# Patient Record
Sex: MALE | Race: WHITE | HISPANIC OR LATINO | Employment: FULL TIME | URBAN - METROPOLITAN AREA
[De-identification: names, ages, dates, MRNs, and addresses within clinical notes are randomized per-mention and may not be internally consistent; named-entity substitution may affect disease eponyms.]

---

## 2023-02-13 ENCOUNTER — OFFICE VISIT (OUTPATIENT)
Dept: URGENT CARE | Facility: CLINIC | Age: 23
End: 2023-02-13

## 2023-02-13 VITALS
RESPIRATION RATE: 14 BRPM | DIASTOLIC BLOOD PRESSURE: 70 MMHG | HEART RATE: 104 BPM | OXYGEN SATURATION: 99 % | SYSTOLIC BLOOD PRESSURE: 120 MMHG | TEMPERATURE: 98.4 F | WEIGHT: 147.5 LBS

## 2023-02-13 DIAGNOSIS — R30.0 DYSURIA: Primary | ICD-10-CM

## 2023-02-13 DIAGNOSIS — N30.01 ACUTE CYSTITIS WITH HEMATURIA: ICD-10-CM

## 2023-02-13 LAB
SL AMB  POCT GLUCOSE, UA: ABNORMAL
SL AMB LEUKOCYTE ESTERASE,UA: ABNORMAL
SL AMB POCT BILIRUBIN,UA: ABNORMAL
SL AMB POCT BLOOD,UA: ABNORMAL
SL AMB POCT CLARITY,UA: ABNORMAL
SL AMB POCT COLOR,UA: YELLOW
SL AMB POCT KETONES,UA: 80
SL AMB POCT NITRITE,UA: ABNORMAL
SL AMB POCT PH,UA: 6.5
SL AMB POCT SPECIFIC GRAVITY,UA: 1.01
SL AMB POCT URINE PROTEIN: 100
SL AMB POCT UROBILINOGEN: 0.2

## 2023-02-13 RX ORDER — SULFAMETHOXAZOLE AND TRIMETHOPRIM 800; 160 MG/1; MG/1
1 TABLET ORAL EVERY 12 HOURS SCHEDULED
Qty: 14 TABLET | Refills: 0 | Status: SHIPPED | OUTPATIENT
Start: 2023-02-13 | End: 2023-02-20

## 2023-02-13 NOTE — PATIENT INSTRUCTIONS
Will send for culture  Check my chart for urine culture and STD results  Start antibiotic and take as directed  Take probiotic  Drink plenty of fluids, water or cranberry juice  Avoid caffeine and alcohol  Tylenol or Motrin for pain or fever  Azo for bladder spasms  Follow up with PCP in 3-5 days  If you develop fever, flank pain, vomiting, abdominal pain, or any new or concerning symptoms please return or proceed to ER

## 2023-02-13 NOTE — PROGRESS NOTES
[id number]        NAME: Melida Anderson is a 23 y o  male  : 2000    MRN: [id number]  DATE: 2023  TIME: 6:39 PM    Assessment and Plan   Dysuria [R30 0]  1  Dysuria  POCT urine dip    Urine culture    Chlamydia/GC amplified DNA by PCR      2  Acute cystitis with hematuria  sulfamethoxazole-trimethoprim (BACTRIM DS) 800-160 mg per tablet            Patient Instructions     Will send for culture  Check my chart for urine culture and STD results  Start antibiotic and take as directed  Take probiotic  Drink plenty of fluids, water or cranberry juice  Avoid caffeine and alcohol  Tylenol or Motrin for pain or fever  Azo for bladder spasms  Follow up with PCP in 3-5 days  If you develop fever, flank pain, vomiting, abdominal pain, or any new or concerning symptoms please return or proceed to ER  Chief Complaint     Chief Complaint   Patient presents with   • Possible UTI     Started 3 days, burning when urinating  Lower abd pain  No ever  Frequency  History of Present Illness       The patient presents today with complaints of dysuria, frequency, hematuria, and suprapubic discomfort x 2 days  He denies fever/chills, flank pain, n/v/d  He denies history of UTIs in the past, or any new sexual partners  Review of Systems   Review of Systems   Constitutional: Positive for appetite change (decreased)  Negative for chills, fatigue and fever  HENT: Negative for congestion  Eyes: Negative  Respiratory: Negative for cough and shortness of breath  Cardiovascular: Negative for chest pain and palpitations  Gastrointestinal: Positive for abdominal pain (suprapubic)  Negative for diarrhea, nausea and vomiting  Genitourinary: Positive for dysuria, frequency and hematuria  Negative for difficulty urinating and flank pain  Musculoskeletal: Negative for myalgias  Skin: Negative for rash  Allergic/Immunologic: Negative for environmental allergies  Neurological: Negative for dizziness and headaches  Psychiatric/Behavioral: Negative  Current Medications       Current Outpatient Medications:   •  sulfamethoxazole-trimethoprim (BACTRIM DS) 800-160 mg per tablet, Take 1 tablet by mouth every 12 (twelve) hours for 7 days, Disp: 14 tablet, Rfl: 0    Current Allergies     Allergies as of 02/13/2023   • (No Known Allergies)            The following portions of the patient's history were reviewed and updated as appropriate: allergies, current medications, past family history, past medical history, past social history, past surgical history and problem list      History reviewed  No pertinent past medical history  History reviewed  No pertinent surgical history  History reviewed  No pertinent family history  Medications have been verified  Objective   /70   Pulse 104   Temp 98 4 °F (36 9 °C)   Resp 14   Wt 66 9 kg (147 lb 8 oz)   SpO2 99%        Physical Exam     Physical Exam  Vitals and nursing note reviewed  Constitutional:       General: He is not in acute distress  Appearance: Normal appearance  He is not ill-appearing  HENT:      Head: Normocephalic and atraumatic  Right Ear: Tympanic membrane, ear canal and external ear normal       Left Ear: Tympanic membrane, ear canal and external ear normal       Nose: Nose normal  No congestion  Mouth/Throat:      Lips: Pink  Mouth: Mucous membranes are moist       Pharynx: Oropharynx is clear  No oropharyngeal exudate or posterior oropharyngeal erythema  Tonsils: No tonsillar exudate  Eyes:      General: Vision grossly intact  Extraocular Movements: Extraocular movements intact  Pupils: Pupils are equal, round, and reactive to light  Cardiovascular:      Rate and Rhythm: Regular rhythm  Tachycardia present  Heart sounds: Normal heart sounds  No murmur heard  Pulmonary:      Effort: Pulmonary effort is normal  No respiratory distress  Breath sounds: Normal breath sounds  No decreased air movement  No decreased breath sounds, wheezing, rhonchi or rales  Abdominal:      General: Abdomen is flat  Bowel sounds are normal       Palpations: Abdomen is soft  Tenderness: There is no abdominal tenderness  There is no right CVA tenderness or left CVA tenderness  Musculoskeletal:         General: Normal range of motion  Cervical back: Normal range of motion  Skin:     General: Skin is warm  Findings: No rash  Neurological:      Mental Status: He is alert and oriented to person, place, and time  Motor: Motor function is intact  Psychiatric:         Attention and Perception: Attention normal          Mood and Affect: Mood normal  Affect is flat

## 2023-02-15 LAB — BACTERIA UR CULT: ABNORMAL

## 2023-05-02 ENCOUNTER — TELEPHONE (OUTPATIENT)
Dept: UROLOGY | Facility: AMBULATORY SURGERY CENTER | Age: 23
End: 2023-05-02

## 2023-05-02 NOTE — TELEPHONE ENCOUNTER
New Patient    What is the reason for the patients appointment?: pt's wife called to make appt for his penis the foreskin is tight and the skin is dried and cracked and inflammation on the end of the penis  Pt is able to urinate ok  His testicles are not swollen       What office location does the patient prefer?: [place]     Does patient have Imaging/Lab Results: no    Have patient records been requested?: no    If No, are the records showing in Epic:       INSURANCE:  Do we accept the patient's insurance or is the patient Self-Pay?:    Insurance Provider: Deleta Light   Plan Type/Number:  Member ID#:       HISTORY:   Has the patient had any previous Urologist(s)?: No    Was the patient seen in the ED?: no    Has the patient had any outside testing done?: no    Does the patient have a personal history of cancer?: no    Pt's mother Smita Ortiz can be reached at [phone]

## 2023-05-03 NOTE — TELEPHONE ENCOUNTER
Returned call to patient spoke with patients mother , reported symptoms over the last year  Did [facility] to [facility] and was given an antibiotic  Offered an appt in our [place] location , declined due to distance   Appt given for 6/13/23 in [address]

## 2023-06-07 ENCOUNTER — TELEPHONE (OUTPATIENT)
Dept: UROLOGY | Facility: CLINIC | Age: 23
End: 2023-06-07

## 2023-06-13 ENCOUNTER — OFFICE VISIT (OUTPATIENT)
Dept: UROLOGY | Facility: CLINIC | Age: 23
End: 2023-06-13
Payer: COMMERCIAL

## 2023-06-13 VITALS
OXYGEN SATURATION: 98 % | SYSTOLIC BLOOD PRESSURE: 116 MMHG | HEIGHT: 66 IN | DIASTOLIC BLOOD PRESSURE: 72 MMHG | WEIGHT: 146 LBS | HEART RATE: 89 BPM | BODY MASS INDEX: 23.46 KG/M2

## 2023-06-13 DIAGNOSIS — Z87.440 HISTORY OF ACUTE CYSTITIS: ICD-10-CM

## 2023-06-13 DIAGNOSIS — N47.1 PHIMOSIS: Primary | ICD-10-CM

## 2023-06-13 DIAGNOSIS — R31.0 GROSS HEMATURIA: ICD-10-CM

## 2023-06-13 PROCEDURE — 99204 OFFICE O/P NEW MOD 45 MIN: CPT | Performed by: UROLOGY

## 2023-06-13 NOTE — PROGRESS NOTES
"Assessment/Plan:   1  Phimosis- patient is able to retract foreskin without difficulty when not erect however when erect foreskin is tight around the shaft  Patient will try sequential stretching of the foreskin however circumcision was offered  Patient will consider  2   Gross hematuria- CT and cystoscopy ordered  Patient thinks that this was secondary to an episode of acute cystitis with urine culture showing staph aureus and small amounts only  3   History of acute cystitis- as above, culture not convincing for UTI  No problem-specific Assessment & Plan notes found for this encounter  Diagnoses and all orders for this visit:    Phimosis    Gross hematuria  -     CT renal protocol; Future  -     Cystoscopy; Future  -     Cytology, urine; Future  -     Comprehensive metabolic panel; Future    History of acute cystitis          Subjective:      Patient ID: Karin Ellis is a 23 y o  male  HPI  18-year-old male who notes an episode of gross hematuria in February 2023 associated with dysuria and lower abdominal discomfort with urine culture showing low amount of Staph aureus but noting response to antibiotic therapy presents complaining of a tight foreskin  His foreskin is retractable however during erections it is very tight around the shaft of the penis  Patient notes a history of gross hematuria at the time of presumed infection  The following portions of the patient's history were reviewed and updated as appropriate: allergies, current medications, past family history, past medical history, past social history, past surgical history and problem list     Review of Systems   Genitourinary: Positive for hematuria  All other systems reviewed and are negative  Objective:      /72   Pulse 89   Ht 5' 5 5\" (1 664 m)   Wt 66 2 kg (146 lb)   SpO2 98%   BMI 23 93 kg/m²          Physical Exam  Vitals reviewed  Constitutional:       General: He is not in acute distress       " Appearance: Normal appearance  He is not ill-appearing, toxic-appearing or diaphoretic  HENT:      Head: Normocephalic and atraumatic  Nose: Nose normal       Mouth/Throat:      Mouth: Mucous membranes are moist    Eyes:      Extraocular Movements: Extraocular movements intact  Pulmonary:      Effort: Pulmonary effort is normal  No respiratory distress  Abdominal:      Palpations: Abdomen is soft  Genitourinary:     Testes: Normal       Comments: Phallus uncircumcised with retractable foreskin that is somewhat tight around the shaft of the penis  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

## 2023-06-26 DIAGNOSIS — R31.0 GROSS HEMATURIA: Primary | ICD-10-CM

## 2023-06-27 ENCOUNTER — OFFICE VISIT (OUTPATIENT)
Dept: FAMILY MEDICINE CLINIC | Facility: CLINIC | Age: 23
End: 2023-06-27
Payer: COMMERCIAL

## 2023-06-27 VITALS
HEIGHT: 66 IN | BODY MASS INDEX: 23.3 KG/M2 | TEMPERATURE: 97.6 F | OXYGEN SATURATION: 99 % | DIASTOLIC BLOOD PRESSURE: 72 MMHG | HEART RATE: 76 BPM | WEIGHT: 145 LBS | SYSTOLIC BLOOD PRESSURE: 118 MMHG

## 2023-06-27 DIAGNOSIS — Z00.00 ANNUAL PHYSICAL EXAM: Primary | ICD-10-CM

## 2023-06-27 DIAGNOSIS — Z76.89 ENCOUNTER TO ESTABLISH CARE WITH NEW DOCTOR: ICD-10-CM

## 2023-06-27 DIAGNOSIS — Z13.6 SCREENING FOR CARDIOVASCULAR CONDITION: ICD-10-CM

## 2023-06-27 DIAGNOSIS — Z13.1 SCREENING FOR DIABETES MELLITUS: ICD-10-CM

## 2023-06-27 PROCEDURE — 99385 PREV VISIT NEW AGE 18-39: CPT | Performed by: STUDENT IN AN ORGANIZED HEALTH CARE EDUCATION/TRAINING PROGRAM

## 2023-06-27 NOTE — PROGRESS NOTES
[address]     NAME: Migue Cutler  AGE: 23 y o  SEX: male  : 2000     DATE: 2023     Assessment and Plan:     Problem List Items Addressed This Visit    None  Visit Diagnoses     Annual physical exam    -  Primary    Screening for diabetes mellitus        Relevant Orders    Comprehensive metabolic panel    CBC and Platelet    Screening for cardiovascular condition        Relevant Orders    Lipid panel    CBC and Platelet    TSH, 3rd generation with Free T4 reflex    Encounter to establish care with new doctor              Immunizations and preventive care screenings were discussed with patient today  Appropriate education was printed on patient's after visit summary  Counseling:  Exercise: the importance of regular exercise/physical activity was discussed  Recommend exercise 3-5 times per week for at least 30 minutes  Depression Screening and Follow-up Plan: Patient was screened for depression during today's encounter  They screened negative with a PHQ-2 score of 0  Return in about 1 year (around 2024) for Annual physical      Chief Complaint:     Chief Complaint   Patient presents with   • New Patient Visit   • Physical Exam      History of Present Illness:     Adult Annual Physical   Patient here for a comprehensive physical exam  The patient reports no problems  Diet and Physical Activity  Diet/Nutrition: well balanced diet  Exercise: walking  Depression Screening  PHQ-2/9 Depression Screening    Little interest or pleasure in doing things: 0 - not at all  Feeling down, depressed, or hopeless: 0 - not at all  PHQ-2 Score: 0  PHQ-2 Interpretation: Negative depression screen       General Health  Sleep: sleeps well  Hearing: normal - bilateral   Vision: no vision problems and goes for regular eye exams  Dental: regular dental visits          Health  History of STDs?: no      Review of Systems:     Review of Systems   Constitutional: Negative for chills, fatigue and fever  HENT: Negative for rhinorrhea and sore throat  Eyes: Negative for visual disturbance  Respiratory: Negative for cough and shortness of breath  Cardiovascular: Negative for chest pain and palpitations  Gastrointestinal: Negative for abdominal pain, constipation, diarrhea, nausea and vomiting  Genitourinary: Negative for difficulty urinating, dysuria and frequency  Musculoskeletal: Negative for arthralgias and myalgias  Skin: Negative for color change and rash  Neurological: Negative for weakness and headaches  Past Medical History:     History reviewed  No pertinent past medical history  Past Surgical History:     History reviewed  No pertinent surgical history     Social History:     Social History     Socioeconomic History   • Marital status: Single     Spouse name: None   • Number of children: None   • Years of education: None   • Highest education level: None   Occupational History   • None   Tobacco Use   • Smoking status: Never     Passive exposure: Never   • Smokeless tobacco: Never   Vaping Use   • Vaping Use: Never used   Substance and Sexual Activity   • Alcohol use: Yes     Comment: occ   • Drug use: Never   • Sexual activity: None   Other Topics Concern   • None   Social History Narrative   • None     Social Determinants of Health     Financial Resource Strain: Not on file   Food Insecurity: Not on file   Transportation Needs: Not on file   Physical Activity: Not on file   Stress: Not on file   Social Connections: Not on file   Intimate Partner Violence: Not on file   Housing Stability: Not on file      Family History:     Family History   Problem Relation Age of Onset   • Cholelithiasis Mother    • Hypertension Father    • No Known Problems Sister    • No Known Problems Brother    • Cancer Maternal Grandmother    • No Known Problems Paternal Grandfather "Current Medications:     No current outpatient medications on file  No current facility-administered medications for this visit  Allergies:     No Known Allergies   Physical Exam:     /72 (BP Location: Left arm, Patient Position: Sitting, Cuff Size: Standard)   Pulse 76   Temp 97 6 °F (36 4 °C)   Ht 5' 5 5\" (1 664 m)   Wt 65 8 kg (145 lb)   SpO2 99%   BMI 23 76 kg/m²     Physical Exam  Constitutional:       General: He is not in acute distress  Appearance: Normal appearance  He is not ill-appearing  HENT:      Head: Normocephalic and atraumatic  Right Ear: Tympanic membrane, ear canal and external ear normal       Left Ear: Tympanic membrane, ear canal and external ear normal       Nose: Nose normal       Mouth/Throat:      Mouth: Mucous membranes are moist       Pharynx: Oropharynx is clear  No oropharyngeal exudate or posterior oropharyngeal erythema  Eyes:      General: No scleral icterus  Right eye: No discharge  Left eye: No discharge  Extraocular Movements: Extraocular movements intact  Conjunctiva/sclera: Conjunctivae normal       Pupils: Pupils are equal, round, and reactive to light  Cardiovascular:      Rate and Rhythm: Normal rate and regular rhythm  Pulses: Normal pulses  Heart sounds: Normal heart sounds  No murmur heard  Pulmonary:      Effort: Pulmonary effort is normal  No respiratory distress  Breath sounds: Normal breath sounds  Abdominal:      General: Bowel sounds are normal       Palpations: Abdomen is soft  Tenderness: There is no abdominal tenderness  Musculoskeletal:         General: Normal range of motion  Cervical back: Normal range of motion and neck supple  Lymphadenopathy:      Cervical: No cervical adenopathy  Skin:     General: Skin is warm and dry  Capillary Refill: Capillary refill takes less than 2 seconds  Neurological:      General: No focal deficit present        Mental Status: He " is alert and oriented to person, place, and time  Mental status is at baseline  Cranial Nerves: No cranial nerve deficit     Psychiatric:         Mood and Affect: Mood normal           Mike Trinidad MD   [facility] [id number]

## 2023-07-11 ENCOUNTER — APPOINTMENT (OUTPATIENT)
Dept: LAB | Facility: HOSPITAL | Age: 23
End: 2023-07-11
Payer: COMMERCIAL

## 2023-07-11 DIAGNOSIS — Z13.1 SCREENING FOR DIABETES MELLITUS: ICD-10-CM

## 2023-07-11 DIAGNOSIS — Z13.6 SCREENING FOR CARDIOVASCULAR CONDITION: ICD-10-CM

## 2023-07-11 DIAGNOSIS — R31.0 GROSS HEMATURIA: ICD-10-CM

## 2023-07-11 LAB
ALBUMIN SERPL BCP-MCNC: 5 G/DL (ref 3.5–5)
ALP SERPL-CCNC: 71 U/L (ref 34–104)
ALT SERPL W P-5'-P-CCNC: 17 U/L (ref 7–52)
ANION GAP SERPL CALCULATED.3IONS-SCNC: 6 MMOL/L
AST SERPL W P-5'-P-CCNC: 14 U/L (ref 13–39)
BILIRUB SERPL-MCNC: 1.97 MG/DL (ref 0.2–1)
BUN SERPL-MCNC: 15 MG/DL (ref 5–25)
CALCIUM SERPL-MCNC: 10.1 MG/DL (ref 8.4–10.2)
CHLORIDE SERPL-SCNC: 102 MMOL/L (ref 96–108)
CHOLEST SERPL-MCNC: 166 MG/DL
CO2 SERPL-SCNC: 30 MMOL/L (ref 21–32)
CREAT SERPL-MCNC: 1.14 MG/DL (ref 0.6–1.3)
ERYTHROCYTE [DISTWIDTH] IN BLOOD BY AUTOMATED COUNT: 11.9 % (ref 11.6–15.1)
GFR SERPL CREATININE-BSD FRML MDRD: 89 ML/MIN/1.73SQ M
GLUCOSE P FAST SERPL-MCNC: 88 MG/DL (ref 65–99)
HCT VFR BLD AUTO: 48.4 % (ref 36.5–49.3)
HDLC SERPL-MCNC: 52 MG/DL
HGB BLD-MCNC: 16.4 G/DL (ref 12–17)
LDLC SERPL CALC-MCNC: 97 MG/DL (ref 0–100)
MCH RBC QN AUTO: 29.8 PG (ref 26.8–34.3)
MCHC RBC AUTO-ENTMCNC: 33.9 G/DL (ref 31.4–37.4)
MCV RBC AUTO: 88 FL (ref 82–98)
NONHDLC SERPL-MCNC: 114 MG/DL
PLATELET # BLD AUTO: 244 THOUSANDS/UL (ref 149–390)
PMV BLD AUTO: 10.3 FL (ref 8.9–12.7)
POTASSIUM SERPL-SCNC: 4.2 MMOL/L (ref 3.5–5.3)
PROT SERPL-MCNC: 8.1 G/DL (ref 6.4–8.4)
RBC # BLD AUTO: 5.5 MILLION/UL (ref 3.88–5.62)
SODIUM SERPL-SCNC: 138 MMOL/L (ref 135–147)
TRIGL SERPL-MCNC: 86 MG/DL
TSH SERPL DL<=0.05 MIU/L-ACNC: 1.27 UIU/ML (ref 0.45–4.5)
WBC # BLD AUTO: 5.91 THOUSAND/UL (ref 4.31–10.16)

## 2023-07-11 PROCEDURE — 80061 LIPID PANEL: CPT

## 2023-07-11 PROCEDURE — 85027 COMPLETE CBC AUTOMATED: CPT

## 2023-07-11 PROCEDURE — 80053 COMPREHEN METABOLIC PANEL: CPT

## 2023-07-11 PROCEDURE — 88112 CYTOPATH CELL ENHANCE TECH: CPT | Performed by: STUDENT IN AN ORGANIZED HEALTH CARE EDUCATION/TRAINING PROGRAM

## 2023-07-11 PROCEDURE — 84443 ASSAY THYROID STIM HORMONE: CPT

## 2023-07-11 PROCEDURE — 36415 COLL VENOUS BLD VENIPUNCTURE: CPT

## 2023-08-16 ENCOUNTER — HOSPITAL ENCOUNTER (OUTPATIENT)
Dept: CT IMAGING | Facility: HOSPITAL | Age: 23
Discharge: HOME | End: 2023-08-16
Attending: UROLOGY
Payer: COMMERCIAL

## 2023-08-16 DIAGNOSIS — R31.0 GROSS HEMATURIA: ICD-10-CM

## 2023-08-16 PROCEDURE — 74178 CT ABD&PLV WO CNTR FLWD CNTR: CPT

## 2023-08-16 PROCEDURE — G1004 CDSM NDSC: HCPCS

## 2023-08-16 RX ADMIN — IOHEXOL 100 ML: 350 INJECTION, SOLUTION INTRAVENOUS at 15:13

## 2023-08-30 ENCOUNTER — TELEPHONE (OUTPATIENT)
Dept: UROLOGY | Facility: MEDICAL CENTER | Age: 23
End: 2023-08-30

## 2023-08-30 NOTE — TELEPHONE ENCOUNTER
----- Message from Kadeem Tsai MD sent at 8/25/2023  3:33 PM EDT -----  Patient should return to the office for office cystoscopy as previously planned

## 2023-09-07 NOTE — TELEPHONE ENCOUNTER
3rd attempt  LMOVM with call back number to reschedule his cysto with Abram duenas that [facility] suggested he have

## 2023-11-20 ENCOUNTER — OFFICE VISIT (OUTPATIENT)
Dept: UROLOGY | Facility: CLINIC | Age: 23
End: 2023-11-20
Payer: COMMERCIAL

## 2023-11-20 VITALS
HEART RATE: 77 BPM | SYSTOLIC BLOOD PRESSURE: 110 MMHG | DIASTOLIC BLOOD PRESSURE: 80 MMHG | OXYGEN SATURATION: 99 % | BODY MASS INDEX: 23.66 KG/M2 | WEIGHT: 142 LBS | HEIGHT: 65 IN

## 2023-11-20 DIAGNOSIS — N28.82 URETERAL DILATATION: Primary | ICD-10-CM

## 2023-11-20 DIAGNOSIS — R31.0 GROSS HEMATURIA: ICD-10-CM

## 2023-11-20 LAB
SL AMB  POCT GLUCOSE, UA: NORMAL
SL AMB LEUKOCYTE ESTERASE,UA: NORMAL
SL AMB POCT BILIRUBIN,UA: NORMAL
SL AMB POCT BLOOD,UA: NORMAL
SL AMB POCT CLARITY,UA: NORMAL
SL AMB POCT COLOR,UA: YELLOW
SL AMB POCT KETONES,UA: 1.5
SL AMB POCT NITRITE,UA: NORMAL
SL AMB POCT PH,UA: 7.5
SL AMB POCT SPECIFIC GRAVITY,UA: 1.02
SL AMB POCT URINE PROTEIN: 0.3
SL AMB POCT UROBILINOGEN: 0.2

## 2023-11-20 PROCEDURE — 81002 URINALYSIS NONAUTO W/O SCOPE: CPT | Performed by: PHYSICIAN ASSISTANT

## 2023-11-20 PROCEDURE — 99213 OFFICE O/P EST LOW 20 MIN: CPT | Performed by: PHYSICIAN ASSISTANT

## 2023-11-20 NOTE — PROGRESS NOTES
11/20/2023      Chief Complaint   Patient presents with    Follow-up       Assessment and Plan    Gross hematuria  - CT urogram from 8/16/23 - No radiopaque stones. No mass identified. Mild dilatation of the distal right ureter. The ureter is visualized to the UVJ. If symptoms continue, consider right ureteroscopy. Mildly diffusely thickened bladder wall, possibly due to under distention.   - Urine cytology negative  - Urine dip today negative  - No recurrent episodes of hematuria  - Obtain updated imaging, renal bladder US ordered  - Reschedule cystoscopy as previously advised for complete work-up. Patient not agreeable to proceed with this at this time. 2. Phimosis   -  Consider circumcision. Declines at this time. History of Present Illness  Carlos Leonard is a 20 y.o. male here for follow up evaluation of gross hematuria and phimosis. He had episode of gross hematuria in February of 2023 with dysuria and abdominal pain. Urine culture at the timed showed low amount of Staph aureus. He also has phimosis and declines management with circumcision. He was advised to have cystoscopy for full work-up. He denies any urinary complaints, hematuria, or flank pain. Review of Systems   Constitutional:  Negative for chills and fever. Respiratory:  Negative for shortness of breath. Cardiovascular:  Negative for chest pain. Gastrointestinal:  Negative for abdominal pain. Genitourinary:  Negative for difficulty urinating, dysuria, flank pain, frequency, hematuria and urgency. Neurological:  Negative for dizziness.            AUA SYMPTOM SCORE      Flowsheet Row Most Recent Value   AUA SYMPTOM SCORE    How often have you had a sensation of not emptying your bladder completely after you finished urinating? 0 (P)     How often have you had to urinate again less than two hours after you finished urinating? 0 (P)     How often have you found you stopped and started again several times when you urinate? 0 (P)     How often have you found it difficult to postpone urination? 0 (P)     How often have you had a weak urinary stream? 0 (P)     How often have you had to push or strain to begin urination? 0 (P)     How many times did you most typically get up to urinate from the time you went to bed at night until the time you got up in the morning? 0 (P)     Quality of Life: If you were to spend the rest of your life with your urinary condition just the way it is now, how would you feel about that? 0 (P)     AUA SYMPTOM SCORE 0 (P)                Past Medical History  Past Medical History:   Diagnosis Date    Urinary tract infection        Past Social History  History reviewed. No pertinent surgical history.   Social History     Tobacco Use   Smoking Status Never    Passive exposure: Never   Smokeless Tobacco Never       Past Family History  Family History   Problem Relation Age of Onset    Cholelithiasis Mother     Hypertension Father     No Known Problems Sister     No Known Problems Brother     Cancer Maternal Grandmother     No Known Problems Paternal Grandfather        Past Social history  Social History     Socioeconomic History    Marital status: Single     Spouse name: Not on file    Number of children: Not on file    Years of education: Not on file    Highest education level: Not on file   Occupational History    Not on file   Tobacco Use    Smoking status: Never     Passive exposure: Never    Smokeless tobacco: Never   Vaping Use    Vaping Use: Never used   Substance and Sexual Activity    Alcohol use: Yes     Comment: occ    Drug use: Not Currently     Types: Marijuana    Sexual activity: Not Currently   Other Topics Concern    Not on file   Social History Narrative    Not on file     Social Determinants of Health     Financial Resource Strain: Not on file   Food Insecurity: Not on file   Transportation Needs: Not on file   Physical Activity: Not on file   Stress: Not on file   Social Connections: Not on file Intimate Partner Violence: Not on file   Housing Stability: Not on file       Current Medications  No current outpatient medications on file. No current facility-administered medications for this visit. Allergies  No Known Allergies      The following portions of the patient's history were reviewed and updated as appropriate: allergies, current medications, past medical history, past social history, past surgical history and problem list.      Vitals  Vitals:    11/20/23 1515   BP: 110/80   Pulse: 77   SpO2: 99%   Weight: 64.4 kg (142 lb)   Height: 5' 5" (1.651 m)           Physical Exam  Physical Exam  Constitutional:       Appearance: Normal appearance. HENT:      Head: Normocephalic and atraumatic. Right Ear: External ear normal.      Left Ear: External ear normal.      Nose: Nose normal.   Eyes:      General: No scleral icterus. Conjunctiva/sclera: Conjunctivae normal.   Cardiovascular:      Pulses: Normal pulses. Pulmonary:      Effort: Pulmonary effort is normal.   Musculoskeletal:         General: Normal range of motion. Cervical back: Normal range of motion. Neurological:      General: No focal deficit present. Mental Status: He is alert and oriented to person, place, and time. Psychiatric:         Mood and Affect: Mood normal.         Behavior: Behavior normal.         Thought Content:  Thought content normal.         Judgment: Judgment normal.           Results  Recent Results (from the past 1 hour(s))   POCT urine dip    Collection Time: 11/20/23  3:17 PM   Result Value Ref Range    LEUKOCYTE ESTERASE,UA -     NITRITE,UA -     SL AMB POCT UROBILINOGEN 0.2     POCT URINE PROTEIN 0.3      PH,UA 7.5     BLOOD,UA -     SPECIFIC GRAVITY,UA 1.020     KETONES,UA 1.5     BILIRUBIN,UA -     GLUCOSE, UA -      COLOR,UA yellow     CLARITY,UA cloudy    ]  No results found for: "PSA"  Lab Results   Component Value Date    CALCIUM 10.1 07/11/2023    K 4.2 07/11/2023    CO2 30 07/11/2023     07/11/2023    BUN 15 07/11/2023    CREATININE 1.14 07/11/2023     Lab Results   Component Value Date    WBC 5.91 07/11/2023    HGB 16.4 07/11/2023    HCT 48.4 07/11/2023    MCV 88 07/11/2023     07/11/2023           Orders  Orders Placed This Encounter   Procedures    US kidney and bladder     Standing Status:   Future     Standing Expiration Date:   11/20/2027     Scheduling Instructions:      Prep required if being scheduled in conjunction with other studies, refer to those examination's Preps first before scheduling. All patients for US Kidney and Bladder they must drink 24 oz of water 60 minutes before your scheduled appointment time. This test requires you to have a FULL bladder. Please do not urinate before your test.            If you have difficulty holding your urine please arrive 30 minutes early to complete your drinking prep. You may take all of your medications for this test.            Pediatric Preps-birth to 12 years             Infants and toddlers to 1 year of age- no drinking prep or restrictions             Toddlers (33 year old)- just give liquids , any clear liquid or juice, and hour prior to the exam             3years old and older- drink liquids (approx. 16 to 24 oz and no soda) 30 minutes before, try to not let the child void until the test is completed            Please bring your insurance cards, a form of photo ID and a list of yourmedications with you. Arrive 15 minutes prior to your appointment time in order to register. If you need to have lab work or a urinalysis, please do this AFTER your ultrasound. To schedule this appointment, please contact Central Scheduling at [phone]. Order Specific Question:   Is a Renal Artery Doppler also being requested in addition to the Kidney/Renal ultrasound ?      Answer:   No    POCT urine dip       Larry Mejia

## 2023-12-03 ENCOUNTER — HOSPITAL ENCOUNTER (OUTPATIENT)
Dept: ULTRASOUND IMAGING | Facility: HOSPITAL | Age: 23
Discharge: HOME | End: 2023-12-03
Payer: COMMERCIAL

## 2023-12-03 DIAGNOSIS — N28.82 URETERAL DILATATION: ICD-10-CM

## 2023-12-03 PROCEDURE — 76775 US EXAM ABDO BACK WALL LIM: CPT

## 2023-12-08 ENCOUNTER — TELEPHONE (OUTPATIENT)
Dept: UROLOGY | Facility: CLINIC | Age: 23
End: 2023-12-08

## 2023-12-08 NOTE — TELEPHONE ENCOUNTER
Called PT and left VM informing PT imaging was clear of any obstructions and provided office number if PT has further questions.       ----- Message from Steve Orellana PA-C sent at 12/7/2023 12:45 PM EST -----  Imaging appears negative for obstruction

## 2024-07-12 ENCOUNTER — OFFICE VISIT (OUTPATIENT)
Dept: FAMILY MEDICINE CLINIC | Facility: CLINIC | Age: 24
End: 2024-07-12
Payer: COMMERCIAL

## 2024-07-12 VITALS
RESPIRATION RATE: 16 BRPM | BODY MASS INDEX: 24.99 KG/M2 | HEIGHT: 65 IN | TEMPERATURE: 98 F | WEIGHT: 150 LBS | DIASTOLIC BLOOD PRESSURE: 84 MMHG | SYSTOLIC BLOOD PRESSURE: 120 MMHG | OXYGEN SATURATION: 99 % | HEART RATE: 75 BPM

## 2024-07-12 DIAGNOSIS — Z13.1 SCREENING FOR DIABETES MELLITUS: ICD-10-CM

## 2024-07-12 DIAGNOSIS — Z00.00 ANNUAL PHYSICAL EXAM: Primary | ICD-10-CM

## 2024-07-12 PROCEDURE — 99395 PREV VISIT EST AGE 18-39: CPT | Performed by: STUDENT IN AN ORGANIZED HEALTH CARE EDUCATION/TRAINING PROGRAM

## 2024-07-12 NOTE — PROGRESS NOTES
Adult Annual Physical  Name: Aristides Kearney      : 2000      MRN: [id number]  Encounter Provider: Ewelina Terry MD  Encounter Date: 2024   Encounter department: [facility] [address]    Assessment & Plan   1. Annual physical exam  2. Screening for diabetes mellitus  -     Comprehensive metabolic panel; Future    May join     Immunizations and preventive care screenings were discussed with patient today. Appropriate education was printed on patient's after visit summary.    Counseling:  Alcohol/drug use: discussed moderation in alcohol intake, the recommendations for healthy alcohol use, and avoidance of illicit drug use.  Dental Health: discussed importance of regular tooth brushing, flossing, and dental visits.  Injury prevention: discussed safety/seat belts, safety helmets, smoke detectors, carbon dioxide detectors, and smoking near bedding or upholstery.  Sexual health: discussed sexually transmitted diseases, partner selection, use of condoms, avoidance of unintended pregnancy, and contraceptive alternatives.  Exercise: the importance of regular exercise/physical activity was discussed. Recommend exercise 3-5 times per week for at least 30 minutes.       Depression Screening and Follow-up Plan: Patient was screened for depression during today's encounter. They screened negative with a PHQ-2 score of 0.        History of Present Illness     Adult Annual Physical:  Patient presents for annual physical.     Diet and Physical Activity:  - Diet/Nutrition: well balanced diet.  - Exercise: walking.    Depression Screening:  - PHQ-2 Score: 0    General Health:  - Sleep: sleeps well.  - Hearing: normal hearing right ear and normal hearing left ear.  - Vision: no vision problems.  - Dental: regular dental visits.     Health:  - History of STDs: no.   - Urinary symptoms: none.     Review of Systems   Constitutional:  Negative for chills, fatigue and fever.  "  HENT:  Negative for rhinorrhea and sore throat.    Eyes:  Negative for visual disturbance.   Respiratory:  Negative for cough and shortness of breath.    Cardiovascular:  Negative for chest pain and palpitations.   Gastrointestinal:  Negative for abdominal pain, constipation, diarrhea, nausea and vomiting.   Genitourinary:  Negative for difficulty urinating, dysuria and frequency.   Musculoskeletal:  Negative for arthralgias and myalgias.   Skin:  Negative for color change and rash.   Neurological:  Negative for weakness and headaches.     Pertinent Medical History      Medical History Reviewed by provider this encounter:  Tobacco  Allergies  Meds  Problems  Med Hx  Surg Hx  Fam Hx       Past Medical History   Past Medical History:   Diagnosis Date   • Urinary tract infection      History reviewed. No pertinent surgical history.  Family History   Problem Relation Age of Onset   • Cholelithiasis Mother    • Hypertension Father    • No Known Problems Sister    • No Known Problems Brother    • Cancer Maternal Grandmother    • No Known Problems Paternal Grandfather      No current outpatient medications on file prior to visit.     No current facility-administered medications on file prior to visit.   No Known Allergies   No current outpatient medications on file prior to visit.     No current facility-administered medications on file prior to visit.      Social History     Tobacco Use   • Smoking status: Never     Passive exposure: Never   • Smokeless tobacco: Never   Vaping Use   • Vaping status: Never Used   Substance and Sexual Activity   • Alcohol use: Yes     Comment: occ   • Drug use: Not Currently     Types: Marijuana   • Sexual activity: Not Currently       Objective     /84 (BP Location: Left arm, Patient Position: Sitting, Cuff Size: Standard)   Pulse 75   Temp 98 °F (36.7 °C) (Tympanic)   Resp 16   Ht 5' 5\" (1.651 m)   Wt 68 kg (150 lb)   SpO2 99%   BMI 24.96 kg/m²       Physical " Exam  Constitutional:       General: He is not in acute distress.     Appearance: Normal appearance. He is not ill-appearing.   HENT:      Head: Normocephalic and atraumatic.      Right Ear: Tympanic membrane, ear canal and external ear normal.      Left Ear: Tympanic membrane, ear canal and external ear normal.      Nose: Nose normal.      Mouth/Throat:      Mouth: Mucous membranes are moist.      Pharynx: Oropharynx is clear. No oropharyngeal exudate or posterior oropharyngeal erythema.   Eyes:      General: No scleral icterus.        Right eye: No discharge.         Left eye: No discharge.      Extraocular Movements: Extraocular movements intact.      Conjunctiva/sclera: Conjunctivae normal.      Pupils: Pupils are equal, round, and reactive to light.   Cardiovascular:      Rate and Rhythm: Normal rate and regular rhythm.      Pulses: Normal pulses.      Heart sounds: Normal heart sounds. No murmur heard.  Pulmonary:      Effort: Pulmonary effort is normal. No respiratory distress.      Breath sounds: Normal breath sounds.   Abdominal:      General: Bowel sounds are normal.      Palpations: Abdomen is soft.      Tenderness: There is no abdominal tenderness.   Musculoskeletal:         General: Normal range of motion.      Cervical back: Normal range of motion and neck supple.   Lymphadenopathy:      Cervical: No cervical adenopathy.   Skin:     General: Skin is warm and dry.      Capillary Refill: Capillary refill takes less than 2 seconds.   Neurological:      General: No focal deficit present.      Mental Status: He is alert and oriented to person, place, and time. Mental status is at baseline.      Cranial Nerves: No cranial nerve deficit.   Psychiatric:         Mood and Affect: Mood normal.

## 2024-07-12 NOTE — PATIENT INSTRUCTIONS
"Patient Education     Routine physical for adults   The Basics   Written by the doctors and editors at [facility]   What is a physical? -- A physical is a routine visit, or \"check-up,\" with your doctor. You might also hear it called a \"wellness visit\" or \"preventive visit.\"  During each visit, the doctor will:   Ask about your physical and mental health   Ask about your habits, behaviors, and lifestyle   Do an exam   Give you vaccines if needed   Talk to you about any medicines you take   Give advice about your health   Answer your questions  Getting regular check-ups is an important part of taking care of your health. It can help your doctor find and treat any problems you have. But it's also important for preventing health problems.  A routine physical is different from a \"sick visit.\" A sick visit is when you see a doctor because of a health concern or problem. Since physicals are scheduled ahead of time, you can think about what you want to ask the doctor.  How often should I get a physical? -- It depends on your age and health. In general, for people age 21 years and older:   If you are younger than 50 years, you might be able to get a physical every 3 years.   If you are 50 years or older, your doctor might recommend a physical every year.  If you have an ongoing health condition, like diabetes or high blood pressure, your doctor will probably want to see you more often.  What happens during a physical? -- In general, each visit will include:   Physical exam - The doctor or nurse will check your height, weight, heart rate, and blood pressure. They will also look at your eyes and ears. They will ask about how you are feeling and whether you have any symptoms that bother you.   Medicines - It's a good idea to bring a list of all the medicines you take to each doctor visit. Your doctor will talk to you about your medicines and answer any questions. Tell them if you are having any side effects that bother you. You " "should also tell them if you are having trouble paying for any of your medicines.   Habits and behaviors - This includes:   Your diet   Your exercise habits   Whether you smoke, drink alcohol, or use drugs   Whether you are sexually active   Whether you feel safe at home  Your doctor will talk to you about things you can do to improve your health and lower your risk of health problems. They will also offer help and support. For example, if you want to quit smoking, they can give you advice and might prescribe medicines. If you want to improve your diet or get more physical activity, they can help you with this, too.   Lab tests, if needed - The tests you get will depend on your age and situation. For example, your doctor might want to check your:   Cholesterol   Blood sugar   Iron level   Vaccines - The recommended vaccines will depend on your age, health, and what vaccines you already had. Vaccines are very important because they can prevent certain serious or deadly infections.   Discussion of screening - \"Screening\" means checking for diseases or other health problems before they cause symptoms. Your doctor can recommend screening based on your age, risk, and preferences. This might include tests to check for:   Cancer, such as breast, prostate, cervical, ovarian, colorectal, prostate, lung, or skin cancer   Sexually transmitted infections, such as chlamydia and gonorrhea   Mental health conditions like depression and anxiety  Your doctor will talk to you about the different types of screening tests. They can help you decide which screenings to have. They can also explain what the results might mean.   Answering questions - The physical is a good time to ask the doctor or nurse questions about your health. If needed, they can refer you to other doctors or specialists, too.  Adults older than 65 years often need other care, too. As you get older, your doctor will talk to you about:   How to prevent falling at " home   Hearing or vision tests   Memory testing   How to take your medicines safely   Making sure that you have the help and support you need at home  All topics are updated as new evidence becomes available and our peer review process is complete.  This topic retrieved from [employer] on: May 02, 2024.  Topic 002024 Version 1.0  Release: 32.4.3 - C32.122  © 2024 UpToDate, Inc[employer] and/or its affiliates. All rights reserved.  Consumer Information Use and Disclaimer   Disclaimer: This generalized information is a limited summary of diagnosis, treatment, and/or medication information. It is not meant to be comprehensive and should be used as a tool to help the user understand and/or assess potential diagnostic and treatment options. It does NOT include all information about conditions, treatments, medications, side effects, or risks that may apply to a specific patient. It is not intended to be medical advice or a substitute for the medical advice, diagnosis, or treatment of a health care provider based on the health care provider's examination and assessment of a patient's specific and unique circumstances. Patients must speak with a health care provider for complete information about their health, medical questions, and treatment options, including any risks or benefits regarding use of medications. This information does not endorse any treatments or medications as safe, effective, or approved for treating a specific patient. Up[employer]Date, Inc[employer] and its affiliates disclaim any warranty or liability relating to this information or the use thereof.The use of this information is governed by the Terms of Use, available at https://www.[employer].com/en/know/clinical-effectiveness-terms. 2024© Up[employer]Date, Inc. and its affiliates and/or licensors. All rights reserved.  Copyright   © 2024 [employer], [employer] and/or its affiliates. All rights reserved.

## 2024-07-18 ENCOUNTER — APPOINTMENT (OUTPATIENT)
Dept: LAB | Facility: HOSPITAL | Age: 24
End: 2024-07-18
Payer: COMMERCIAL

## 2024-07-18 DIAGNOSIS — Z13.1 SCREENING FOR DIABETES MELLITUS: ICD-10-CM

## 2024-07-18 LAB
ALBUMIN SERPL BCG-MCNC: 4.8 G/DL (ref 3.5–5)
ALP SERPL-CCNC: 71 U/L (ref 34–104)
ALT SERPL W P-5'-P-CCNC: 19 U/L (ref 7–52)
ANION GAP SERPL CALCULATED.3IONS-SCNC: 8 MMOL/L (ref 4–13)
AST SERPL W P-5'-P-CCNC: 15 U/L (ref 13–39)
BILIRUB SERPL-MCNC: 1.84 MG/DL (ref 0.2–1)
BUN SERPL-MCNC: 11 MG/DL (ref 5–25)
CALCIUM SERPL-MCNC: 9.5 MG/DL (ref 8.4–10.2)
CHLORIDE SERPL-SCNC: 105 MMOL/L (ref 96–108)
CO2 SERPL-SCNC: 27 MMOL/L (ref 21–32)
CREAT SERPL-MCNC: 0.98 MG/DL (ref 0.6–1.3)
GFR SERPL CREATININE-BSD FRML MDRD: 106 ML/MIN/1.73SQ M
GLUCOSE P FAST SERPL-MCNC: 93 MG/DL (ref 65–99)
POTASSIUM SERPL-SCNC: 3.9 MMOL/L (ref 3.5–5.3)
PROT SERPL-MCNC: 7.3 G/DL (ref 6.4–8.4)
SODIUM SERPL-SCNC: 140 MMOL/L (ref 135–147)

## 2024-07-18 PROCEDURE — 80053 COMPREHEN METABOLIC PANEL: CPT

## 2024-07-18 PROCEDURE — 36415 COLL VENOUS BLD VENIPUNCTURE: CPT
